# Patient Record
Sex: FEMALE | Race: BLACK OR AFRICAN AMERICAN | NOT HISPANIC OR LATINO | URBAN - METROPOLITAN AREA
[De-identification: names, ages, dates, MRNs, and addresses within clinical notes are randomized per-mention and may not be internally consistent; named-entity substitution may affect disease eponyms.]

---

## 2024-10-15 ENCOUNTER — APPOINTMENT (OUTPATIENT)
Dept: PRIMARY CARE | Facility: CLINIC | Age: 39
End: 2024-10-15
Payer: COMMERCIAL

## 2024-10-15 VITALS
HEIGHT: 66 IN | DIASTOLIC BLOOD PRESSURE: 96 MMHG | SYSTOLIC BLOOD PRESSURE: 145 MMHG | HEART RATE: 109 BPM | WEIGHT: 184 LBS | OXYGEN SATURATION: 96 % | BODY MASS INDEX: 29.57 KG/M2

## 2024-10-15 DIAGNOSIS — Z80.0 FAMILY HISTORY OF COLON CANCER IN MOTHER: ICD-10-CM

## 2024-10-15 DIAGNOSIS — Z82.3 FAMILY HISTORY OF STROKE: ICD-10-CM

## 2024-10-15 DIAGNOSIS — R29.898 RIGHT ARM WEAKNESS: Primary | ICD-10-CM

## 2024-10-15 DIAGNOSIS — Z00.00 HEALTH CARE MAINTENANCE: ICD-10-CM

## 2024-10-15 DIAGNOSIS — M79.601 RIGHT ARM PAIN: ICD-10-CM

## 2024-10-15 DIAGNOSIS — Z80.0 FAMILY HISTORY OF COLON CANCER IN FATHER: ICD-10-CM

## 2024-10-15 PROCEDURE — 99213 OFFICE O/P EST LOW 20 MIN: CPT | Performed by: STUDENT IN AN ORGANIZED HEALTH CARE EDUCATION/TRAINING PROGRAM

## 2024-10-15 PROCEDURE — 3008F BODY MASS INDEX DOCD: CPT | Performed by: STUDENT IN AN ORGANIZED HEALTH CARE EDUCATION/TRAINING PROGRAM

## 2024-10-15 NOTE — PROGRESS NOTES
"Subjective   Patient ID: Blank Bear is a 39 y.o. female who presents for Establish Care.  HPI  Blank Bear is 39 y.o. is here to establish care    Chronic active medical problems    # right arm  in tetanus vaccine in may    achy on and off  since then   6-7 /10   Some weakness  some pain currently     bp      Past surgeries right ovarian cyst removal in 2004  Allergies red dye in tylenol     T X  Alc X  Marijaun X    FH   DM: mom .   Stroke: mom   Mother- colon cancer:  55  Father - colon and cancer  50   Heart issues: none         Past Medical History:   Diagnosis Date    Acute vaginitis 05/01/2015    Bacterial vaginosis    Cutaneous abscess of buttock 07/20/2015    Abscess of buttock    Other fecal abnormalities 11/11/2016    Loose stools    Personal history of other specified conditions 12/08/2016    History of diarrhea    Unspecified abdominal pain 12/08/2016    Abdominal cramping    Unspecified ovarian cyst, left side 04/22/2015    Left ovarian cyst      Past Surgical History:   Procedure Laterality Date    OTHER SURGICAL HISTORY  04/22/2015    Ovarian Cystectomy Right      No family history on file.   Not on File       Occupation:     Review of Systems   Constitutional:  Negative for activity change and fever.   HENT:  Negative for congestion.    Respiratory:  Negative for cough, shortness of breath and wheezing.    Cardiovascular:  Negative for chest pain and leg swelling.   Gastrointestinal:  Negative for abdominal pain, constipation, nausea and vomiting.   Endocrine: Negative for cold intolerance.   Genitourinary:  Negative for dysuria, hematuria and urgency.   Neurological:  Negative for dizziness, speech difficulty, weakness and numbness.   Psychiatric/Behavioral:  Negative for self-injury and suicidal ideas.        Objective   Visit Vitals  BP (!) 145/96   Pulse 109   Ht 1.676 m (5' 6\")   Wt 83.5 kg (184 lb)   SpO2 96%   BMI 29.70 kg/m²   Smoking Status Never   BSA 1.97 m²      Physical " Exam  HENT:      Head: Normocephalic and atraumatic.      Right Ear: Tympanic membrane normal.      Left Ear: Tympanic membrane normal.      Nose: Nose normal.      Mouth/Throat:      Mouth: Mucous membranes are moist.   Eyes:      Extraocular Movements: Extraocular movements intact.      Conjunctiva/sclera: Conjunctivae normal.      Pupils: Pupils are equal, round, and reactive to light.   Cardiovascular:      Rate and Rhythm: Normal rate and regular rhythm.      Pulses: Normal pulses.      Heart sounds: Normal heart sounds.   Pulmonary:      Effort: Pulmonary effort is normal.      Breath sounds: Normal breath sounds. No stridor. No rhonchi.   Abdominal:      General: Bowel sounds are normal.      Palpations: Abdomen is soft.      Tenderness: There is no abdominal tenderness. There is no guarding or rebound.   Musculoskeletal:      Cervical back: Neck supple.   Neurological:      Mental Status: She is oriented to person, place, and time.      Cranial Nerves: No cranial nerve deficit.      Sensory: No sensory deficit.      Motor: No weakness.      Coordination: Coordination normal.      Gait: Gait normal.      Deep Tendon Reflexes: Reflexes normal.   Psychiatric:         Mood and Affect: Mood normal.         Behavior: Behavior normal.         Assessment/Plan   Diagnoses and all orders for this visit:  Right arm weakness  Unsure of the cause. Subjective weakness; no FND elucidated   Will get EMG and MRI   -     EMG & nerve conduction; Future  -     MR brain wo IV contrast; Future  Right arm pain  No miladys sign   -     US extremity nonvascular real time w image documentation limited anatomic specific; Future  Health care maintenance  -     Hemoglobin A1C; Future  -     CBC; Future  -     Lipid Panel; Future  -     Comprehensive Metabolic Panel; Future  -     TSH with reflex to Free T4 if abnormal; Future  -     Colonoscopy Screening; High Risk Patient; father and mother with colon cancer; Future  Family history of  stroke  -     EMG & nerve conduction; Future  -     MR brain wo IV contrast; Future  Family history of colon cancer in father  -     Colonoscopy Screening; High Risk Patient; father and mother with colon cancer; Future  Family history of colon cancer in mother  -     Colonoscopy Screening; High Risk Patient; father and mother with colon cancer; Future  Patient to seek medical attention immediately / call 911  if has worsening of symptoms  or develops alarm symptoms as discussed. Verbalizes understanding

## 2024-10-22 ENCOUNTER — APPOINTMENT (OUTPATIENT)
Dept: RADIOLOGY | Facility: HOSPITAL | Age: 39
End: 2024-10-22
Payer: COMMERCIAL

## 2024-10-24 ASSESSMENT — ENCOUNTER SYMPTOMS
WEAKNESS: 0
CONSTIPATION: 0
FEVER: 0
NAUSEA: 0
DYSURIA: 0
DIZZINESS: 0
COUGH: 0
VOMITING: 0
SPEECH DIFFICULTY: 0
WHEEZING: 0
HEMATURIA: 0
NUMBNESS: 0
ABDOMINAL PAIN: 0
SHORTNESS OF BREATH: 0
ACTIVITY CHANGE: 0

## 2024-10-31 ENCOUNTER — HOSPITAL ENCOUNTER (OUTPATIENT)
Dept: RADIOLOGY | Facility: HOSPITAL | Age: 39
End: 2024-10-31
Payer: COMMERCIAL

## 2024-11-16 ENCOUNTER — APPOINTMENT (OUTPATIENT)
Dept: RADIOLOGY | Facility: CLINIC | Age: 39
End: 2024-11-16
Payer: COMMERCIAL

## 2024-11-18 ENCOUNTER — APPOINTMENT (OUTPATIENT)
Dept: PRIMARY CARE | Facility: CLINIC | Age: 39
End: 2024-11-18
Payer: COMMERCIAL

## 2024-11-18 DIAGNOSIS — R29.898 RIGHT ARM WEAKNESS: ICD-10-CM

## 2024-11-18 DIAGNOSIS — M79.601 RIGHT ARM PAIN: Primary | ICD-10-CM

## 2024-11-23 ENCOUNTER — HOSPITAL ENCOUNTER (OUTPATIENT)
Dept: RADIOLOGY | Facility: HOSPITAL | Age: 39
Discharge: HOME | End: 2024-11-23
Payer: COMMERCIAL

## 2024-11-23 DIAGNOSIS — Z82.3 FAMILY HISTORY OF STROKE: ICD-10-CM

## 2024-11-23 DIAGNOSIS — R29.898 RIGHT ARM WEAKNESS: ICD-10-CM

## 2024-11-23 PROCEDURE — 70551 MRI BRAIN STEM W/O DYE: CPT | Performed by: RADIOLOGY

## 2024-11-23 PROCEDURE — 70551 MRI BRAIN STEM W/O DYE: CPT

## 2024-11-27 DIAGNOSIS — M79.601 RIGHT ARM PAIN: Primary | ICD-10-CM

## 2024-11-27 DIAGNOSIS — R29.898 RIGHT ARM WEAKNESS: ICD-10-CM

## 2024-11-29 ENCOUNTER — APPOINTMENT (OUTPATIENT)
Dept: RADIOLOGY | Facility: CLINIC | Age: 39
End: 2024-11-29
Payer: COMMERCIAL

## 2024-11-29 ENCOUNTER — APPOINTMENT (OUTPATIENT)
Dept: PRIMARY CARE | Facility: CLINIC | Age: 39
End: 2024-11-29
Payer: COMMERCIAL

## 2024-11-29 DIAGNOSIS — Z12.31 SCREENING MAMMOGRAM FOR BREAST CANCER: ICD-10-CM

## 2024-12-11 ENCOUNTER — APPOINTMENT (OUTPATIENT)
Dept: PRIMARY CARE | Facility: CLINIC | Age: 39
End: 2024-12-11
Payer: COMMERCIAL

## 2024-12-24 ENCOUNTER — APPOINTMENT (OUTPATIENT)
Dept: RADIOLOGY | Facility: CLINIC | Age: 39
End: 2024-12-24
Payer: COMMERCIAL

## 2025-04-13 ENCOUNTER — APPOINTMENT (OUTPATIENT)
Dept: RADIOLOGY | Facility: HOSPITAL | Age: 40
End: 2025-04-13
Payer: COMMERCIAL

## 2025-04-13 ENCOUNTER — APPOINTMENT (OUTPATIENT)
Dept: CARDIOLOGY | Facility: HOSPITAL | Age: 40
End: 2025-04-13
Payer: COMMERCIAL

## 2025-04-13 ENCOUNTER — HOSPITAL ENCOUNTER (EMERGENCY)
Facility: HOSPITAL | Age: 40
Discharge: HOME | End: 2025-04-13
Attending: STUDENT IN AN ORGANIZED HEALTH CARE EDUCATION/TRAINING PROGRAM
Payer: COMMERCIAL

## 2025-04-13 VITALS
HEIGHT: 66 IN | BODY MASS INDEX: 30.22 KG/M2 | OXYGEN SATURATION: 98 % | DIASTOLIC BLOOD PRESSURE: 81 MMHG | RESPIRATION RATE: 13 BRPM | WEIGHT: 188 LBS | TEMPERATURE: 97.9 F | SYSTOLIC BLOOD PRESSURE: 124 MMHG | HEART RATE: 74 BPM

## 2025-04-13 DIAGNOSIS — B97.89 VIRAL SINUSITIS: ICD-10-CM

## 2025-04-13 DIAGNOSIS — R07.9 CHEST PAIN, UNSPECIFIED TYPE: ICD-10-CM

## 2025-04-13 DIAGNOSIS — R51.9 ACUTE NONINTRACTABLE HEADACHE, UNSPECIFIED HEADACHE TYPE: Primary | ICD-10-CM

## 2025-04-13 DIAGNOSIS — J32.9 VIRAL SINUSITIS: ICD-10-CM

## 2025-04-13 LAB
AMORPH CRY #/AREA UR COMP ASSIST: ABNORMAL /HPF
ANION GAP SERPL CALC-SCNC: 12 MMOL/L (ref 10–20)
APPEARANCE UR: ABNORMAL
BACTERIA #/AREA URNS AUTO: ABNORMAL /HPF
BASOPHILS # BLD AUTO: 0.03 X10*3/UL (ref 0–0.1)
BASOPHILS NFR BLD AUTO: 0.4 %
BILIRUB UR STRIP.AUTO-MCNC: NEGATIVE MG/DL
BUN SERPL-MCNC: 13 MG/DL (ref 6–23)
CALCIUM SERPL-MCNC: 9.4 MG/DL (ref 8.6–10.3)
CARDIAC TROPONIN I PNL SERPL HS: <3 NG/L (ref 0–13)
CARDIAC TROPONIN I PNL SERPL HS: <3 NG/L (ref 0–13)
CHLORIDE SERPL-SCNC: 107 MMOL/L (ref 98–107)
CO2 SERPL-SCNC: 27 MMOL/L (ref 21–32)
COLOR UR: ABNORMAL
CREAT SERPL-MCNC: 0.92 MG/DL (ref 0.5–1.05)
EGFRCR SERPLBLD CKD-EPI 2021: 80 ML/MIN/1.73M*2
EOSINOPHIL # BLD AUTO: 0.13 X10*3/UL (ref 0–0.7)
EOSINOPHIL NFR BLD AUTO: 1.6 %
ERYTHROCYTE [DISTWIDTH] IN BLOOD BY AUTOMATED COUNT: 13.2 % (ref 11.5–14.5)
FLUAV RNA RESP QL NAA+PROBE: NOT DETECTED
FLUBV RNA RESP QL NAA+PROBE: NOT DETECTED
GLUCOSE SERPL-MCNC: 121 MG/DL (ref 74–99)
GLUCOSE UR STRIP.AUTO-MCNC: NORMAL MG/DL
HCG UR QL IA.RAPID: NEGATIVE
HCT VFR BLD AUTO: 32.9 % (ref 36–46)
HGB BLD-MCNC: 10 G/DL (ref 12–16)
IMM GRANULOCYTES # BLD AUTO: 0.02 X10*3/UL (ref 0–0.7)
IMM GRANULOCYTES NFR BLD AUTO: 0.2 % (ref 0–0.9)
KETONES UR STRIP.AUTO-MCNC: NEGATIVE MG/DL
LEUKOCYTE ESTERASE UR QL STRIP.AUTO: ABNORMAL
LYMPHOCYTES # BLD AUTO: 1.46 X10*3/UL (ref 1.2–4.8)
LYMPHOCYTES NFR BLD AUTO: 17.5 %
MAGNESIUM SERPL-MCNC: 1.99 MG/DL (ref 1.6–2.4)
MCH RBC QN AUTO: 28 PG (ref 26–34)
MCHC RBC AUTO-ENTMCNC: 30.4 G/DL (ref 32–36)
MCV RBC AUTO: 92 FL (ref 80–100)
MONOCYTES # BLD AUTO: 0.7 X10*3/UL (ref 0.1–1)
MONOCYTES NFR BLD AUTO: 8.4 %
MUCOUS THREADS #/AREA URNS AUTO: ABNORMAL /LPF
NEUTROPHILS # BLD AUTO: 6 X10*3/UL (ref 1.2–7.7)
NEUTROPHILS NFR BLD AUTO: 71.9 %
NITRITE UR QL STRIP.AUTO: NEGATIVE
NRBC BLD-RTO: 0 /100 WBCS (ref 0–0)
PH UR STRIP.AUTO: 7.5 [PH]
PLATELET # BLD AUTO: 279 X10*3/UL (ref 150–450)
POTASSIUM SERPL-SCNC: 3.9 MMOL/L (ref 3.5–5.3)
PROT UR STRIP.AUTO-MCNC: NEGATIVE MG/DL
RBC # BLD AUTO: 3.57 X10*6/UL (ref 4–5.2)
RBC # UR STRIP.AUTO: NEGATIVE MG/DL
RBC #/AREA URNS AUTO: ABNORMAL /HPF
SARS-COV-2 RNA RESP QL NAA+PROBE: NOT DETECTED
SODIUM SERPL-SCNC: 142 MMOL/L (ref 136–145)
SP GR UR STRIP.AUTO: 1.02
SQUAMOUS #/AREA URNS AUTO: ABNORMAL /HPF
UROBILINOGEN UR STRIP.AUTO-MCNC: NORMAL MG/DL
WBC # BLD AUTO: 8.3 X10*3/UL (ref 4.4–11.3)
WBC #/AREA URNS AUTO: ABNORMAL /HPF

## 2025-04-13 PROCEDURE — 81025 URINE PREGNANCY TEST: CPT | Performed by: STUDENT IN AN ORGANIZED HEALTH CARE EDUCATION/TRAINING PROGRAM

## 2025-04-13 PROCEDURE — 99285 EMERGENCY DEPT VISIT HI MDM: CPT | Mod: 25 | Performed by: STUDENT IN AN ORGANIZED HEALTH CARE EDUCATION/TRAINING PROGRAM

## 2025-04-13 PROCEDURE — 71046 X-RAY EXAM CHEST 2 VIEWS: CPT | Performed by: RADIOLOGY

## 2025-04-13 PROCEDURE — 85025 COMPLETE CBC W/AUTO DIFF WBC: CPT | Performed by: STUDENT IN AN ORGANIZED HEALTH CARE EDUCATION/TRAINING PROGRAM

## 2025-04-13 PROCEDURE — 80048 BASIC METABOLIC PNL TOTAL CA: CPT | Performed by: STUDENT IN AN ORGANIZED HEALTH CARE EDUCATION/TRAINING PROGRAM

## 2025-04-13 PROCEDURE — 87636 SARSCOV2 & INF A&B AMP PRB: CPT | Performed by: STUDENT IN AN ORGANIZED HEALTH CARE EDUCATION/TRAINING PROGRAM

## 2025-04-13 PROCEDURE — 93005 ELECTROCARDIOGRAM TRACING: CPT

## 2025-04-13 PROCEDURE — 71046 X-RAY EXAM CHEST 2 VIEWS: CPT

## 2025-04-13 PROCEDURE — 2500000004 HC RX 250 GENERAL PHARMACY W/ HCPCS (ALT 636 FOR OP/ED): Performed by: STUDENT IN AN ORGANIZED HEALTH CARE EDUCATION/TRAINING PROGRAM

## 2025-04-13 PROCEDURE — 36415 COLL VENOUS BLD VENIPUNCTURE: CPT | Performed by: STUDENT IN AN ORGANIZED HEALTH CARE EDUCATION/TRAINING PROGRAM

## 2025-04-13 PROCEDURE — 83735 ASSAY OF MAGNESIUM: CPT | Performed by: STUDENT IN AN ORGANIZED HEALTH CARE EDUCATION/TRAINING PROGRAM

## 2025-04-13 PROCEDURE — 87086 URINE CULTURE/COLONY COUNT: CPT | Mod: AHULAB | Performed by: STUDENT IN AN ORGANIZED HEALTH CARE EDUCATION/TRAINING PROGRAM

## 2025-04-13 PROCEDURE — 84484 ASSAY OF TROPONIN QUANT: CPT | Performed by: STUDENT IN AN ORGANIZED HEALTH CARE EDUCATION/TRAINING PROGRAM

## 2025-04-13 PROCEDURE — 96366 THER/PROPH/DIAG IV INF ADDON: CPT

## 2025-04-13 PROCEDURE — 96365 THER/PROPH/DIAG IV INF INIT: CPT

## 2025-04-13 PROCEDURE — 96375 TX/PRO/DX INJ NEW DRUG ADDON: CPT

## 2025-04-13 PROCEDURE — 81001 URINALYSIS AUTO W/SCOPE: CPT | Performed by: STUDENT IN AN ORGANIZED HEALTH CARE EDUCATION/TRAINING PROGRAM

## 2025-04-13 RX ORDER — PROCHLORPERAZINE EDISYLATE 5 MG/ML
10 INJECTION INTRAMUSCULAR; INTRAVENOUS ONCE
Status: COMPLETED | OUTPATIENT
Start: 2025-04-13 | End: 2025-04-13

## 2025-04-13 RX ORDER — IBUPROFEN 600 MG/1
600 TABLET ORAL EVERY 6 HOURS PRN
Qty: 30 TABLET | Refills: 0 | Status: SHIPPED | OUTPATIENT
Start: 2025-04-13 | End: 2025-04-20

## 2025-04-13 RX ORDER — MAGNESIUM SULFATE HEPTAHYDRATE 40 MG/ML
2 INJECTION, SOLUTION INTRAVENOUS ONCE
Status: COMPLETED | OUTPATIENT
Start: 2025-04-13 | End: 2025-04-13

## 2025-04-13 RX ORDER — KETOROLAC TROMETHAMINE 30 MG/ML
15 INJECTION, SOLUTION INTRAMUSCULAR; INTRAVENOUS ONCE
Status: COMPLETED | OUTPATIENT
Start: 2025-04-13 | End: 2025-04-13

## 2025-04-13 RX ADMIN — MAGNESIUM SULFATE HEPTAHYDRATE 2 G: 40 INJECTION, SOLUTION INTRAVENOUS at 18:19

## 2025-04-13 RX ADMIN — PROCHLORPERAZINE EDISYLATE 10 MG: 5 INJECTION INTRAMUSCULAR; INTRAVENOUS at 18:10

## 2025-04-13 RX ADMIN — SODIUM CHLORIDE, SODIUM LACTATE, POTASSIUM CHLORIDE, AND CALCIUM CHLORIDE 1000 ML: .6; .31; .03; .02 INJECTION, SOLUTION INTRAVENOUS at 18:11

## 2025-04-13 RX ADMIN — KETOROLAC TROMETHAMINE 15 MG: 30 INJECTION, SOLUTION INTRAMUSCULAR at 18:10

## 2025-04-13 ASSESSMENT — PAIN DESCRIPTION - LOCATION
LOCATION: CHEST
LOCATION: HEAD

## 2025-04-13 ASSESSMENT — PAIN SCALES - GENERAL
PAINLEVEL_OUTOF10: 0 - NO PAIN
PAINLEVEL_OUTOF10: 7
PAINLEVEL_OUTOF10: 4

## 2025-04-13 ASSESSMENT — COLUMBIA-SUICIDE SEVERITY RATING SCALE - C-SSRS
6. HAVE YOU EVER DONE ANYTHING, STARTED TO DO ANYTHING, OR PREPARED TO DO ANYTHING TO END YOUR LIFE?: NO
1. IN THE PAST MONTH, HAVE YOU WISHED YOU WERE DEAD OR WISHED YOU COULD GO TO SLEEP AND NOT WAKE UP?: NO
2. HAVE YOU ACTUALLY HAD ANY THOUGHTS OF KILLING YOURSELF?: NO

## 2025-04-13 ASSESSMENT — PAIN - FUNCTIONAL ASSESSMENT: PAIN_FUNCTIONAL_ASSESSMENT: 0-10

## 2025-04-13 NOTE — ED TRIAGE NOTES
Pt to ED with complaint of SOB and chest pain with exertion onset of three days. Arrives ambulatory and a/o x4. Denies any cardiac hx. Denies N/V/D. Denies any other complaints.

## 2025-04-13 NOTE — ED PROVIDER NOTES
Emergency Department Provider Note        History of Present Illness     Chief Complaint: Headache, Chest Pain  History provided by: Patient  Limitations to History: None  External Chart Review: PCP visit 10/15/24 seen for health maintenance, noted subjective weakness R arm ultimately obtained MRI brain which was negative for acute pathology    HPI:  Blank Bear is a 39 y.o. female presenting to the ED for headache.     Patient reports over the past few days she has had sinus pain, sinus pressure, nasal congestion, sore throat.  Reports that she had a feeling of fullness in her throat and has been trying to take over-the-counter sinus medications without much relief of symptoms.  She denies dysphagia, dysphonia, drooling, stridor fevers, chills, cough, shortness of breath.  Today she developed a headache that is mostly frontal in nature.  She denies visual changes, numbness, weakness, neck pain or stiffness.  Denies falls or trauma.  Also reports that while walking at the grocery store she had some chest pain that has since resolved.  Denies associated diaphoresis or nausea. Patient denied recent surgery or immobilization, recent long travel, hormonal birth control or hormone replacement therapy, prior history of DVT/PE.  She reports that she has a family history of early CVA (but no hx family CAD) so was concerned about the chest pain and came to the ED.    Physical Exam   Triage vitals:  T 36.6 °C (97.9 °F)  HR (!) 116  /83  RR 18  O2 99 % None (Room air)    Constitutional: Awake, alert, not in acute distress  HENT: Head atraumatic, mucous membranes moist, oropharynx mildly erythematous, no exudates or swelling, uvula midline, no stridor  Eyes:  Conjunctivae normal  Neck:  Supple  Lungs: Clear to auscultation, breath sounds equal and symmetric, no wheezes, rales, or rhonchi  Heart: Tachycardic during triage however on my exam regular rate and rhythm, no murmur, rub or gallop, 2+ radial and DP pulses  bilaterally  Abdomen: Soft, nontender, nondistended, no rebound or guarding  Extremities: No lower extremity edema  Neuro: Alert and oriented to person, place, and time, PERRL, CN 2-12 intact, strength 5/5 BUE and BLE, SILT BUE and BLE,  normal FNF and ANGELA, normal gait  Skin: Warm, dry  Psych: Calm, cooperative    Medical Decision Making & ED Course   Medical Decision Making:  Blank Bear is a 39 y.o. female who presented to the ED for headache and chest pain. Patient was afebrile, hemodynamically stable, and satting on room air on arrival.     Patient is well appearing, in no acute distress, benign, nonfocal neurologic exam with CN II-XII, strength, sensation, coordination, and gait in tact.    Clinical course as below in ED course:  ED Course as of 04/13/25 2334   Sun Apr 13, 2025   1800 ECG 12 lead  I have personally reviewed and interpreted this EKG.  Sinus tachycardia, rate 116 BPM  Normal axis  KS interval and QRS duration within normal limits.  QTc within normal limits.  No signs of acute ischemia or infarction   [SS]   1801 XR chest 2 views  I have personally reviewed and interpreted this CXR, which shows no PTX, PNA, pulmonary edema, or widened mediastinum. Final decision making pending radiology read.     [SS]   1811 XR chest 2 views  Radiology read:  IMPRESSION:  No focal infiltrate or pneumothorax.   [SS]   1840 CBC and Auto Differential(!)  Mild anemia, no leukocytosis or thrombocytopenia [SS]   1910 Sars-CoV-2 and Influenza A/B PCR  neg [SS]   1910 Troponin I Series, High Sensitivity (0, 1 HR)  neg [SS]   1920 Basic metabolic panel(!)  No clinically significant electrolyte abnormalities, no THANIA   [SS]   1920 Magnesium  normal [SS]   2036 hCG, Urine, Qualitative  neg [SS]      ED Course User Index  [SS] Steffen Simerlink, MD         Diagnoses as of 04/13/25 2334   Acute nonintractable headache, unspecified headache type   Viral sinusitis   Chest pain, unspecified type       A/P:    Initial EKG shows  a sinus tachycardia without signs of acute ischemia.  This was obtained during her in triage intake and on my exam she is no longer tachycardic without any interventions.  Chest x-ray without evidence of pneumonia, pneumothorax, pulmonary edema, pleural effusion.  Labs otherwise unremarkable as above.  He has no red flag features of headache to necessitate CT today.  She was treated symptomatically with Toradol, Compazine, magnesium, and a bolus of LR.    On re-examination, they are well-appearing and have remained hemodynamically stable. They are ambulating without difficulty and tolerating PO.  Her symptoms have completely resolved.  Suspect likely viral syndrome/viral sinusitis as etiology of presentation.  They are appropriate for discharge at this time. They will follow up with PCP. New prescriptions for discharge below. Return precautions were reviewed. Plan was discussed with patient and they are agreeable.  ------------------------------------------------  Independent Test Interpretation: See ED Course  CT head considered but not performed due to no red flag features, normal neuro exam    Disposition   As a result of the work-up, the patient was discharged home.  she was informed of her diagnosis and instructed to come back with any concerns or worsening of condition.  she and was agreeable to the plan as discussed above.  she was given the opportunity to ask questions.  All of the patient's questions were answered.    ED Prescriptions       Medication Sig Dispense Start Date End Date Auth. Provider    ibuprofen 600 mg tablet Take 1 tablet (600 mg) by mouth every 6 hours if needed for mild pain (1 - 3) for up to 7 days. 30 tablet 4/13/2025 4/20/2025 Steffen Simerlink, MD            Procedures   Procedures    Steffen Simerlink, MD Steffen Simerlink, MD  04/13/25 9181

## 2025-04-14 LAB
ATRIAL RATE: 116 BPM
HOLD SPECIMEN: NORMAL
P AXIS: 78 DEGREES
P OFFSET: 203 MS
P ONSET: 143 MS
PR INTERVAL: 156 MS
Q ONSET: 221 MS
QRS COUNT: 19 BEATS
QRS DURATION: 74 MS
QT INTERVAL: 322 MS
QTC CALCULATION(BAZETT): 447 MS
QTC FREDERICIA: 401 MS
R AXIS: 26 DEGREES
T AXIS: 63 DEGREES
T OFFSET: 382 MS
VENTRICULAR RATE: 116 BPM

## 2025-04-14 NOTE — DISCHARGE INSTRUCTIONS
Do not share your medication with anyone.    Headache Instructions: Return to the ED if your headache worsens, you develop weakness or numbness on one side of the body or the other, you develop blurred vision or difficulty with speech.  Chest Pain Instructions: Return to the ED if you develop worsening shortness of breath, chest pain, coughing up blood, or pain that radiates from your chest to your back.

## 2025-04-15 LAB — BACTERIA UR CULT: NORMAL

## 2025-04-22 ENCOUNTER — APPOINTMENT (OUTPATIENT)
Dept: OBSTETRICS AND GYNECOLOGY | Facility: CLINIC | Age: 40
End: 2025-04-22
Payer: COMMERCIAL

## 2025-05-06 ENCOUNTER — HOSPITAL ENCOUNTER (EMERGENCY)
Facility: HOSPITAL | Age: 40
Discharge: HOME | End: 2025-05-07
Attending: EMERGENCY MEDICINE
Payer: COMMERCIAL

## 2025-05-06 ENCOUNTER — APPOINTMENT (OUTPATIENT)
Dept: RADIOLOGY | Facility: HOSPITAL | Age: 40
End: 2025-05-06
Payer: COMMERCIAL

## 2025-05-06 ENCOUNTER — OFFICE VISIT (OUTPATIENT)
Dept: URGENT CARE | Age: 40
End: 2025-05-06
Payer: COMMERCIAL

## 2025-05-06 VITALS
BODY MASS INDEX: 30.34 KG/M2 | SYSTOLIC BLOOD PRESSURE: 128 MMHG | OXYGEN SATURATION: 99 % | DIASTOLIC BLOOD PRESSURE: 84 MMHG | WEIGHT: 188 LBS | HEART RATE: 95 BPM | TEMPERATURE: 98.5 F

## 2025-05-06 DIAGNOSIS — M54.9 BACK PAIN, UNSPECIFIED BACK LOCATION, UNSPECIFIED BACK PAIN LATERALITY, UNSPECIFIED CHRONICITY: Primary | ICD-10-CM

## 2025-05-06 DIAGNOSIS — R10.9 ACUTE RIGHT FLANK PAIN: Primary | ICD-10-CM

## 2025-05-06 DIAGNOSIS — N13.30 HYDRONEPHROSIS, UNSPECIFIED HYDRONEPHROSIS TYPE: ICD-10-CM

## 2025-05-06 LAB
ALBUMIN SERPL BCP-MCNC: 4.4 G/DL (ref 3.4–5)
ALP SERPL-CCNC: 39 U/L (ref 33–110)
ALT SERPL W P-5'-P-CCNC: 9 U/L (ref 7–45)
ANION GAP SERPL CALC-SCNC: 10 MMOL/L (ref 10–20)
APPEARANCE UR: ABNORMAL
AST SERPL W P-5'-P-CCNC: 13 U/L (ref 9–39)
BACTERIA #/AREA URNS AUTO: ABNORMAL /HPF
BASOPHILS # BLD AUTO: 0.02 X10*3/UL (ref 0–0.1)
BASOPHILS NFR BLD AUTO: 0.2 %
BILIRUB SERPL-MCNC: 0.6 MG/DL (ref 0–1.2)
BILIRUB UR STRIP.AUTO-MCNC: NEGATIVE MG/DL
BUN SERPL-MCNC: 13 MG/DL (ref 6–23)
CALCIUM SERPL-MCNC: 9.3 MG/DL (ref 8.6–10.3)
CHLORIDE SERPL-SCNC: 106 MMOL/L (ref 98–107)
CO2 SERPL-SCNC: 28 MMOL/L (ref 21–32)
COLOR UR: ABNORMAL
CREAT SERPL-MCNC: 1.07 MG/DL (ref 0.5–1.05)
EGFRCR SERPLBLD CKD-EPI 2021: 67 ML/MIN/1.73M*2
EOSINOPHIL # BLD AUTO: 0.02 X10*3/UL (ref 0–0.7)
EOSINOPHIL NFR BLD AUTO: 0.2 %
ERYTHROCYTE [DISTWIDTH] IN BLOOD BY AUTOMATED COUNT: 12.5 % (ref 11.5–14.5)
GLUCOSE SERPL-MCNC: 115 MG/DL (ref 74–99)
GLUCOSE UR STRIP.AUTO-MCNC: NORMAL MG/DL
HCG UR QL IA.RAPID: NEGATIVE
HCT VFR BLD AUTO: 32.7 % (ref 36–46)
HGB BLD-MCNC: 10.4 G/DL (ref 12–16)
IMM GRANULOCYTES # BLD AUTO: 0.05 X10*3/UL (ref 0–0.7)
IMM GRANULOCYTES NFR BLD AUTO: 0.4 % (ref 0–0.9)
KETONES UR STRIP.AUTO-MCNC: NEGATIVE MG/DL
LACTATE SERPL-SCNC: 1.2 MMOL/L (ref 0.4–2)
LEUKOCYTE ESTERASE UR QL STRIP.AUTO: ABNORMAL
LYMPHOCYTES # BLD AUTO: 0.84 X10*3/UL (ref 1.2–4.8)
LYMPHOCYTES NFR BLD AUTO: 6.4 %
MCH RBC QN AUTO: 28.7 PG (ref 26–34)
MCHC RBC AUTO-ENTMCNC: 31.8 G/DL (ref 32–36)
MCV RBC AUTO: 90 FL (ref 80–100)
MONOCYTES # BLD AUTO: 0.73 X10*3/UL (ref 0.1–1)
MONOCYTES NFR BLD AUTO: 5.5 %
MUCOUS THREADS #/AREA URNS AUTO: ABNORMAL /LPF
NEUTROPHILS # BLD AUTO: 11.5 X10*3/UL (ref 1.2–7.7)
NEUTROPHILS NFR BLD AUTO: 87.3 %
NITRITE UR QL STRIP.AUTO: NEGATIVE
NRBC BLD-RTO: 0 /100 WBCS (ref 0–0)
PH UR STRIP.AUTO: 8.5 [PH]
PLATELET # BLD AUTO: 301 X10*3/UL (ref 150–450)
POC APPEARANCE, URINE: CLEAR
POC BILIRUBIN, URINE: NEGATIVE
POC BLOOD, URINE: ABNORMAL
POC COLOR, URINE: YELLOW
POC GLUCOSE, URINE: NEGATIVE MG/DL
POC KETONES, URINE: NEGATIVE MG/DL
POC LEUKOCYTES, URINE: NEGATIVE
POC NITRITE,URINE: NEGATIVE
POC PH, URINE: 8 PH
POC PROTEIN, URINE: ABNORMAL MG/DL
POC SPECIFIC GRAVITY, URINE: 1.01
POC UROBILINOGEN, URINE: 0.2 EU/DL
POTASSIUM SERPL-SCNC: 4.3 MMOL/L (ref 3.5–5.3)
PREGNANCY TEST URINE, POC: NEGATIVE
PROT SERPL-MCNC: 7.4 G/DL (ref 6.4–8.2)
PROT UR STRIP.AUTO-MCNC: ABNORMAL MG/DL
RBC # BLD AUTO: 3.63 X10*6/UL (ref 4–5.2)
RBC # UR STRIP.AUTO: ABNORMAL MG/DL
RBC #/AREA URNS AUTO: >20 /HPF
SODIUM SERPL-SCNC: 140 MMOL/L (ref 136–145)
SP GR UR STRIP.AUTO: 1.03
SQUAMOUS #/AREA URNS AUTO: ABNORMAL /HPF
UROBILINOGEN UR STRIP.AUTO-MCNC: ABNORMAL MG/DL
WBC # BLD AUTO: 13.2 X10*3/UL (ref 4.4–11.3)
WBC #/AREA URNS AUTO: ABNORMAL /HPF

## 2025-05-06 PROCEDURE — 99203 OFFICE O/P NEW LOW 30 MIN: CPT

## 2025-05-06 PROCEDURE — 1036F TOBACCO NON-USER: CPT

## 2025-05-06 PROCEDURE — 2550000001 HC RX 255 CONTRASTS

## 2025-05-06 PROCEDURE — 81025 URINE PREGNANCY TEST: CPT

## 2025-05-06 PROCEDURE — 96375 TX/PRO/DX INJ NEW DRUG ADDON: CPT

## 2025-05-06 PROCEDURE — 99285 EMERGENCY DEPT VISIT HI MDM: CPT | Mod: 25 | Performed by: EMERGENCY MEDICINE

## 2025-05-06 PROCEDURE — 96361 HYDRATE IV INFUSION ADD-ON: CPT

## 2025-05-06 PROCEDURE — 85025 COMPLETE CBC W/AUTO DIFF WBC: CPT

## 2025-05-06 PROCEDURE — 87086 URINE CULTURE/COLONY COUNT: CPT | Mod: AHULAB

## 2025-05-06 PROCEDURE — 96374 THER/PROPH/DIAG INJ IV PUSH: CPT | Mod: 59

## 2025-05-06 PROCEDURE — 83605 ASSAY OF LACTIC ACID: CPT

## 2025-05-06 PROCEDURE — 2500000004 HC RX 250 GENERAL PHARMACY W/ HCPCS (ALT 636 FOR OP/ED): Performed by: EMERGENCY MEDICINE

## 2025-05-06 PROCEDURE — 74177 CT ABD & PELVIS W/CONTRAST: CPT | Performed by: RADIOLOGY

## 2025-05-06 PROCEDURE — 80053 COMPREHEN METABOLIC PANEL: CPT

## 2025-05-06 PROCEDURE — 81003 URINALYSIS AUTO W/O SCOPE: CPT

## 2025-05-06 PROCEDURE — 36415 COLL VENOUS BLD VENIPUNCTURE: CPT

## 2025-05-06 PROCEDURE — 81001 URINALYSIS AUTO W/SCOPE: CPT

## 2025-05-06 PROCEDURE — 74177 CT ABD & PELVIS W/CONTRAST: CPT

## 2025-05-06 RX ORDER — KETOROLAC TROMETHAMINE 30 MG/ML
15 INJECTION, SOLUTION INTRAMUSCULAR; INTRAVENOUS ONCE
Status: COMPLETED | OUTPATIENT
Start: 2025-05-06 | End: 2025-05-06

## 2025-05-06 RX ORDER — ONDANSETRON HYDROCHLORIDE 2 MG/ML
4 INJECTION, SOLUTION INTRAVENOUS ONCE
Status: COMPLETED | OUTPATIENT
Start: 2025-05-06 | End: 2025-05-06

## 2025-05-06 RX ADMIN — SODIUM CHLORIDE, SODIUM LACTATE, POTASSIUM CHLORIDE, AND CALCIUM CHLORIDE 1000 ML: .6; .31; .03; .02 INJECTION, SOLUTION INTRAVENOUS at 23:03

## 2025-05-06 RX ADMIN — ONDANSETRON 4 MG: 2 INJECTION, SOLUTION INTRAMUSCULAR; INTRAVENOUS at 23:03

## 2025-05-06 RX ADMIN — KETOROLAC TROMETHAMINE 15 MG: 30 INJECTION, SOLUTION INTRAMUSCULAR at 22:55

## 2025-05-06 RX ADMIN — IOHEXOL 75 ML: 350 INJECTION, SOLUTION INTRAVENOUS at 20:58

## 2025-05-06 ASSESSMENT — PAIN SCALES - GENERAL
PAINLEVEL_OUTOF10: 0 - NO PAIN
PAINLEVEL_OUTOF10: 6
PAINLEVEL_OUTOF10: 8

## 2025-05-06 ASSESSMENT — PAIN - FUNCTIONAL ASSESSMENT
PAIN_FUNCTIONAL_ASSESSMENT: 0-10
PAIN_FUNCTIONAL_ASSESSMENT: 0-10

## 2025-05-06 ASSESSMENT — PAIN DESCRIPTION - ORIENTATION
ORIENTATION: RIGHT;LEFT
ORIENTATION: RIGHT

## 2025-05-06 ASSESSMENT — PAIN DESCRIPTION - LOCATION
LOCATION: ABDOMEN
LOCATION: BACK

## 2025-05-06 ASSESSMENT — ENCOUNTER SYMPTOMS
DYSURIA: 0
FREQUENCY: 1
BACK PAIN: 1
CONSTITUTIONAL NEGATIVE: 1

## 2025-05-06 NOTE — Clinical Note
Blank Bear was seen and treated in our emergency department on 5/6/2025.  She may return to work on 05/08/2025.       If you have any questions or concerns, please don't hesitate to call.      Junior Lewis MD

## 2025-05-06 NOTE — PROGRESS NOTES
Subjective   Patient ID: Blank Bear is a 39 y.o. female. They present today with a chief complaint of Back Pain (C/o mid low back pain  radiating to right x 3 hours.  Relief when empty bladder.  ).    History of Present Illness  39-year-old female presents to clinic today with complaints of right sided back pain.  Patient states that the mid low back that radiates to the right side.  Patient states been ongoing for 3 hours and progressively worsening.  Patient states the pain is 10 out of 10.  She did begin puking and office.  She denies fevers.  She states some symptoms are relieved with urination but they return.  She states she is currently on her menstrual cycle.  Denies abdominal pain.      Back Pain  Pertinent negatives include no dysuria.       Past Medical History  Allergies as of 05/06/2025 - Reviewed 04/13/2025   Allergen Reaction Noted    Acetaminophen Hives 04/13/2025    Red dye Hives 05/06/2025       Prescriptions Prior to Admission[1]     Medical History[2]    Surgical History[3]     reports that she has never smoked. She has never used smokeless tobacco. She reports that she does not currently use alcohol. She reports that she does not use drugs.    Review of Systems  Review of Systems   Constitutional: Negative.    Genitourinary:  Positive for frequency and urgency. Negative for dysuria.   Musculoskeletal:  Positive for back pain.                                  Objective    Vitals:    05/06/25 1907   BP: 128/84   Pulse: 95   Temp: 36.9 °C (98.5 °F)   TempSrc: Oral   SpO2: 99%   Weight: 85.3 kg (188 lb)     No LMP recorded.    Physical Exam  Constitutional:       Appearance: Normal appearance.   Abdominal:      Tenderness: There is right CVA tenderness.   Neurological:      Mental Status: She is alert.         Procedures    Point of Care Test & Imaging Results from this visit  No results found for this visit on 05/06/25.   Imaging  No results found.    Cardiology, Vascular, and Other Imaging  No  other imaging results found for the past 2 days      Diagnostic study results (if any) were reviewed by Suhas Barcenas PA-C.    Assessment/Plan   Allergies, medications, history, and pertinent labs/EKGs/Imaging reviewed by Suhas Barcenas PA-C.     Medical Decision Making  Patient pleasant on examination.    She is in acute pain.  She did vomit in office.  There is noted tenderness to the right CVA.    Urinalysis was completed did reveal blood.  This may be from the menstrual cycle however I am also concerned for possible kidney stone.    I discussed with patient that I cannot give a full diagnosis without CT scan however if we treated as such I can give oral Zofran and Toradol.  I did advise with this severe pain she is in a confirmed diagnosis I recommend the emergency room.  She is in agreement with plan and going to [facility] for further evaluation.    Orders and Diagnoses  Diagnoses and all orders for this visit:  Back pain, unspecified back location, unspecified back pain laterality, unspecified chronicity  -     POCT UA Automated manually resulted  -     POCT pregnancy, urine manually resulted      Medical Admin Record      Patient disposition: ED    Electronically signed by Suhas Barcenas PA-C  7:18 PM           [1] (Not in a hospital admission)  [2]   Past Medical History:  Diagnosis Date    Acute vaginitis 05/01/2015    Bacterial vaginosis    Cutaneous abscess of buttock 07/20/2015    Abscess of buttock    Other fecal abnormalities 11/11/2016    Loose stools    Personal history of other specified conditions 12/08/2016    History of diarrhea    Unspecified abdominal pain 12/08/2016    Abdominal cramping    Unspecified ovarian cyst, left side 04/22/2015    Left ovarian cyst   [3]   Past Surgical History:  Procedure Laterality Date    OTHER SURGICAL HISTORY  04/22/2015    Ovarian Cystectomy Right

## 2025-05-06 NOTE — ED TRIAGE NOTES
Pt to ED with complaint of right flank pain radiating into the back. Concern for kidney stone. Pt referred from urgent care. Denies any diarrhea, endorses vomiting. States sx have been going on for the last four hours. Denies CP/SOB. Denies any urinary sx.

## 2025-05-07 VITALS
TEMPERATURE: 98.2 F | RESPIRATION RATE: 16 BRPM | HEART RATE: 92 BPM | SYSTOLIC BLOOD PRESSURE: 124 MMHG | OXYGEN SATURATION: 98 % | DIASTOLIC BLOOD PRESSURE: 80 MMHG

## 2025-05-07 RX ORDER — TAMSULOSIN HYDROCHLORIDE 0.4 MG/1
0.4 CAPSULE ORAL DAILY
Qty: 14 CAPSULE | Refills: 0 | Status: SHIPPED | OUTPATIENT
Start: 2025-05-07 | End: 2025-05-21

## 2025-05-07 RX ORDER — NAPROXEN 500 MG/1
500 TABLET ORAL
Qty: 14 TABLET | Refills: 0 | Status: SHIPPED | OUTPATIENT
Start: 2025-05-07 | End: 2025-05-14

## 2025-05-07 RX ORDER — ONDANSETRON 4 MG/1
4 TABLET, ORALLY DISINTEGRATING ORAL EVERY 8 HOURS PRN
Qty: 9 TABLET | Refills: 0 | Status: SHIPPED | OUTPATIENT
Start: 2025-05-07 | End: 2025-05-10

## 2025-05-07 NOTE — DISCHARGE INSTRUCTIONS
Please take naproxen for pain and Zofran if needed for nausea.  Take Flomax as this will increase passage of suspected stone in the ureter.  Referral has been placed for follow-up with urologist.  Please return to emergency department for reassessment if new or worsening symptoms.

## 2025-05-07 NOTE — ED TRIAGE NOTES
As provider-in-triage, I performed a medical screening history and physical exam on this patient.  HISTORY OF PRESENT ILLNESS  Patient is a 39-year-old female presenting with concern for right-sided flank pain.  Pain starts in the right back radiates around to the right abdomen.  She feels nauseous and has vomited 2 times.  She has had subjective chills but no fevers.  She has no prior history of kidney stones.  Denies urinary symptoms.  She is currently on her menstrual cycle.  Patient was seen urgent care who referred her to ED.     PHYSICAL EXAM  Vital Signs reviewed.  Cardiovascular: Regular rate and rhythm. No m/g/r  Respiratory: No respiratory distress. No accessory muscle use. Breath sounds are present and equal b/l. No adventitious sounds.   Abdomen: There is right-sided CVA tenderness       MDM  Workup initiated. Pt stable pending bed availability and further evaluation. Please see subsequent provider note for further details and disposition.         I evaluated this patient in triage with the RN. Due to the patients complaint labs and or imaging were ordered by myself in an attempt to expedite patient care however I am not participating in care after evaluation. This is a preliminary assessment. Pt does not appear in acute distress at this time. They will have a full evaluation as soon as possible. They will be cared for by another provider who will possibly order more labs, imaging or interventions. Pt did not have a full ROS or PE completed by myself however above is a summary with reasons for orders.  For the remainder of the patient's workup and ED course, please refer to the main ED provider note. We discussed need for diagnostic testing including laboratory studies and imaging.  We also discussed that patient may be asked to wait in the waiting room while these tests are pending.  They understand that if they choose to leave without having the testing completed or resulted that we cannot rule out  acute life threatening illnesses and the risks involved could lead to worsening condition, permanent disability or even death.

## 2025-05-07 NOTE — ED PROVIDER NOTES
Chief Complaint   Patient presents with    Flank Pain     Right     History of Present Illness   Blank Bear   MRN [id number]    39-year-old presents for evaluation of right-sided mid back pain radiating to the right abdomen associated with nausea and vomiting started earlier this afternoon.  No associated chest pain or shortness of breath.  Initially seen at urgent care and referred to the emergency department for further workup.    Physical Exam   T 36.8 °C (98.2 °F)  HR 90  /76  RR 18  O2 98 % None (Room air)    General: Sitting on stretcher appears uncomfortable.  Head: Atraumatic.  Eyes: No conjunctival injection.   Ears Nose Throat: No epistaxis. Oral mucosa moist.  Neck: Supple.  Cardiovascular: Extremities warm.   Pulmonary: No stridor. Normal respiratory effort.  Abdominal: No distention.  Soft.  Right sided tenderness without rebound or guarding.  Right CVA tenderness.  Musculoskeletal: No deformity or joint swelling.  No midline cervical thoracic or lumbar tenderness.  Skin: No rash.  Psychiatric: Does not appear to respond to internal stimuli.  Neurologic: Alert. Follows directions. Face symmetric. No aphasia or dysarthria. Symmetric movement of upper and lower extremities.    ED Course & Medical Decision Making   Triage vital signs within normal limits.  Patient appeared uncomfortable but nontoxic.  Acute onset right-sided back pain radiating to the right lower abdomen associated with nausea and vomiting somewhat relieved by urination most suspicious for ureteral stone.  Beta-hCG was negative.  Labs demonstrated mild leukocytosis, slight increase in creatinine compared to previous, normal LFTs and lactate.  Urinalysis more suggestive of ureteral stone rather than concurrent urinary tract infection.  CT of the abdomen pelvis demonstrated right hydronephrosis and perinephric stranding as well as possible calculus in the distal right ureter though localization technically difficult.    After  Toradol and intravenous fluids and Zofran patient was well-appearing and reported that symptoms had almost entirely resolved.  She was comfortable returning home.  Referral for follow-up with urologist and prescription for Flomax, naproxen, and Zofran.  I discussed reasons for return to the emergency department for reassessment especially fever, vomiting, inadequate control of pain,, difficulty urinating.  Discharged in good condition.    ED Course as of 05/07/25 0101   Tue May 06, 2025   1784 I reviewed urgent care note from earlier same day.  Patient was evaluated for mid low back pain radiating to the right side for the past 3 hours associated with nausea and vomiting.  Symptoms improved with urination.  Received oral Zofran and Toradol and referred to emergency department for suspected ureteral stone. []   1249 I reviewed note from 3/9/2024 from [facility].  Patient was evaluated for right-sided abdominal pain for several weeks at that time.  Worse after eating.  Documented medical conditions including acne, anemia, depression, fibroid uterus, ovarian cyst that was removed, spontaneous .  Limited ultrasound of the abdomen at that time demonstrated no gallstones or other abnormalities of the gallbladder.  CT abdomen pelvis with IV contrast did not demonstrate a clear explanation for patient's symptoms. [MC]      ED Course User Index  [MC] Junior Lewis MD         Diagnoses as of 25   Acute right flank pain   Hydronephrosis, unspecified hydronephrosis type            Junior Lewis MD  25

## 2025-05-08 LAB — BACTERIA UR CULT: NORMAL

## 2025-05-19 ENCOUNTER — APPOINTMENT (OUTPATIENT)
Dept: PRIMARY CARE | Facility: CLINIC | Age: 40
End: 2025-05-19
Payer: COMMERCIAL

## 2025-05-19 ENCOUNTER — APPOINTMENT (OUTPATIENT)
Dept: OBSTETRICS AND GYNECOLOGY | Facility: HOSPITAL | Age: 40
End: 2025-05-19
Payer: COMMERCIAL

## 2025-05-19 ENCOUNTER — APPOINTMENT (OUTPATIENT)
Dept: UROLOGY | Facility: HOSPITAL | Age: 40
End: 2025-05-19
Payer: COMMERCIAL

## 2025-05-19 ENCOUNTER — OFFICE VISIT (OUTPATIENT)
Dept: UROLOGY | Facility: CLINIC | Age: 40
End: 2025-05-19
Payer: COMMERCIAL

## 2025-05-19 VITALS — TEMPERATURE: 97.7 F

## 2025-05-19 DIAGNOSIS — R10.9 ACUTE RIGHT FLANK PAIN: ICD-10-CM

## 2025-05-19 DIAGNOSIS — N20.0 KIDNEY STONES: Primary | ICD-10-CM

## 2025-05-19 DIAGNOSIS — N13.30 HYDRONEPHROSIS, UNSPECIFIED HYDRONEPHROSIS TYPE: ICD-10-CM

## 2025-05-19 PROCEDURE — 1036F TOBACCO NON-USER: CPT | Performed by: UROLOGY

## 2025-05-19 PROCEDURE — 99203 OFFICE O/P NEW LOW 30 MIN: CPT | Performed by: UROLOGY

## 2025-05-19 RX ORDER — SODIUM SULFACETAMIDE 100 MG/ML
LIQUID TOPICAL
COMMUNITY
Start: 2025-02-04

## 2025-05-19 RX ORDER — TRETINOIN 1 MG/G
CREAM TOPICAL
COMMUNITY
Start: 2023-11-13

## 2025-05-19 RX ORDER — CLINDAMYCIN PHOSPHATE AND BENZOYL PEROXIDE 10; 50 MG/G; MG/G
GEL TOPICAL
COMMUNITY
Start: 2023-10-19

## 2025-05-19 ASSESSMENT — PAIN SCALES - GENERAL: PAINLEVEL_OUTOF10: 0-NO PAIN

## 2025-05-19 NOTE — PROGRESS NOTES
Subjective   Blank Bear is a 39 y.o. female presenting as a new patient today due to nephrolithiasis. Patient presented to the ER on 5/6/2025 with complaints of  right sided flank pain radiating to the right abdomen associated with nausea and vomiting. CT A&P was obtained which showed right hydronephrosis with delayed renal enhancement and perinephric stranding as well as a punctate 1 mm calcification is noted in the urinary bladder. She has not visualized stone passage. Patient has no prior history of renal stones. Currently she is asymptomatic. Denies any recent gross hematuria, fevers, chills, urinary retention, intractable flank or abdominal pain, nausea or vomiting.          Medical History[1]  Surgical History[2]  Family History[3]  Current Medications[4]  Allergies[5]  Social History     Socioeconomic History    Marital status:      Spouse name: Not on file    Number of children: Not on file    Years of education: Not on file    Highest education level: Not on file   Occupational History    Not on file   Tobacco Use    Smoking status: Never    Smokeless tobacco: Never   Substance and Sexual Activity    Alcohol use: Not Currently    Drug use: Never    Sexual activity: Not on file   Other Topics Concern    Not on file   Social History Narrative    Not on file     Social Drivers of Health     Financial Resource Strain: Low Risk  (7/7/2023)    Received from [facility] ([facility])    Overall Financial Resource Strain (CARDIA)     Difficulty of Paying Living Expenses: Not hard at all   Food Insecurity: No Food Insecurity (7/7/2023)    Received from [facility] ([facility])    Hunger Vital Sign     Worried About Running Out of Food in the Last Year: Never true     Ran Out of Food in the Last Year: Never true   Transportation Needs: No Transportation Needs (7/7/2023)    Received from [facility] ([facility])    PRAPARE - Transportation     Lack  of Transportation (Medical): No     Lack of Transportation (Non-Medical): No   Physical Activity: Sufficiently Active (7/7/2023)    Received from [facility] ([facility])    Exercise Vital Sign     Days of Exercise per Week: 4 days     Minutes of Exercise per Session: 60 min   Stress: No Stress Concern Present (7/7/2023)    Received from [facility] ([facility])    [employer] - Occupational Stress Questionnaire     Feeling of Stress : Only a little   Social Connections: Not on file   Intimate Partner Violence: Not At Risk (7/7/2023)    Received from [facility] ([facility])    Humiliation, Afraid, Rape, and Kick questionnaire     Fear of Current or Ex-Partner: No     Emotionally Abused: No     Physically Abused: No     Sexually Abused: No   Housing Stability: Low Risk  (4/21/2024)    Received from [facility]    Housing Stability     Unstable Housing in the Last Year: Not on file       Review of Systems  Pertinent items are noted in HPI.    Objective       Lab Review  Lab Results   Component Value Date    WBC 13.2 (H) 05/06/2025    RBC 3.63 (L) 05/06/2025    HGB 10.4 (L) 05/06/2025    HCT 32.7 (L) 05/06/2025     05/06/2025      Lab Results   Component Value Date    BUN 13 05/06/2025    CREATININE 1.07 (H) 05/06/2025          Assessment/Plan   There are no diagnoses linked to this encounter.    Nephrolithiasis   Right hydronephrosis    I personally and independently reviewed images of the CT A&P from 5/6/2025 which showed right hydronephrosis with delayed renal enhancement and perinephric stranding as well as a punctate 1 mm calcification is noted in the urinary bladder.    Patient has not visualized stone passage.     We will obtain a renal US to confirm resolution of hydronephrosis and follow up virtually to review.     Patient was educated on stone prevention lifestyle changes and dietary modifications which  include increased water intake, citric acid supplementation in the form of lemon juice or lemonade, low oxalate diet, moderate protein intake, and low sodium intake.         All questions were answered to the patient's satisfaction. Patient agrees with the plan and wishes to proceed. Follow-up will be scheduled appropriately.       Scribed for Dr. Tiwari by Iris Ghotra. I , Dr Tiwari, have personally reviewed and agreed with the information entered by the Virtual Scribe.          [1]   Past Medical History:  Diagnosis Date    Acute vaginitis 05/01/2015    Bacterial vaginosis    Cutaneous abscess of buttock 07/20/2015    Abscess of buttock    Other fecal abnormalities 11/11/2016    Loose stools    Personal history of other specified conditions 12/08/2016    History of diarrhea    Unspecified abdominal pain 12/08/2016    Abdominal cramping    Unspecified ovarian cyst, left side 04/22/2015    Left ovarian cyst   [2]   Past Surgical History:  Procedure Laterality Date    OTHER SURGICAL HISTORY  04/22/2015    Ovarian Cystectomy Right   [3] No family history on file.  [4]   Current Outpatient Medications   Medication Sig Dispense Refill    tamsulosin (Flomax) 0.4 mg 24 hr capsule Take 1 capsule (0.4 mg) by mouth once daily for 14 days. 14 capsule 0     No current facility-administered medications for this visit.   [5]   Allergies  Allergen Reactions    Acetaminophen Hives    Red Dye Hives

## 2025-05-22 ENCOUNTER — HOSPITAL ENCOUNTER (OUTPATIENT)
Dept: RADIOLOGY | Facility: HOSPITAL | Age: 40
Discharge: HOME | End: 2025-05-22
Payer: COMMERCIAL

## 2025-05-22 DIAGNOSIS — N20.0 KIDNEY STONES: ICD-10-CM

## 2025-05-22 PROCEDURE — 76770 US EXAM ABDO BACK WALL COMP: CPT

## 2025-06-02 ENCOUNTER — APPOINTMENT (OUTPATIENT)
Dept: UROLOGY | Facility: CLINIC | Age: 40
End: 2025-06-02
Payer: COMMERCIAL

## 2025-06-02 DIAGNOSIS — N20.0 KIDNEY STONES: ICD-10-CM

## 2025-06-02 DIAGNOSIS — N13.30 HYDRONEPHROSIS, UNSPECIFIED HYDRONEPHROSIS TYPE: Primary | ICD-10-CM

## 2025-06-02 PROCEDURE — 99213 OFFICE O/P EST LOW 20 MIN: CPT | Performed by: UROLOGY

## 2025-06-02 NOTE — PROGRESS NOTES
Subjective     This visit was completed via telemedicine. All issues as below were discussed and addressed but no physical exam was performed unless allowed by visual confirmation. If it was felt that the patient should be evaluated in clinic, then they were directed there. Patient verbally consented to visit.    Blank Bear is a 39 y.o. female with history of nephrolithiasis and hydronephrosis; presenting today to review renal US. Patient is doing well. She is currently asymptomatic Denies any recent gross hematuria, fevers, chills, urinary retention, intractable flank or abdominal pain, nausea or vomiting.          LAST VISIT (5/19/2025):  Blank Bear is a 39 y.o. female presenting as a new patient today due to nephrolithiasis. Patient presented to the ER on 5/6/2025 with complaints of  right sided flank pain radiating to the right abdomen associated with nausea and vomiting. CT A&P was obtained which showed right hydronephrosis with delayed renal enhancement and perinephric stranding as well as a punctate 1 mm calcification is noted in the urinary bladder. She has not visualized stone passage. Patient has no prior history of renal stones. Currently she is asymptomatic. Denies any recent gross hematuria, fevers, chills, urinary retention, intractable flank or abdominal pain, nausea or vomiting.    Medical History[1]  Surgical History[2]  Family History[3]  Current Medications[4]  Allergies[5]  Social History     Socioeconomic History    Marital status:      Spouse name: Not on file    Number of children: Not on file    Years of education: Not on file    Highest education level: Not on file   Occupational History    Not on file   Tobacco Use    Smoking status: Never    Smokeless tobacco: Never   Substance and Sexual Activity    Alcohol use: Not Currently    Drug use: Never    Sexual activity: Not on file   Other Topics Concern    Not on file   Social History Narrative    Not on file     Social Drivers  of Health     Financial Resource Strain: Low Risk  (7/7/2023)    Received from [facility] ([facility])    Overall Financial Resource Strain (CARDIA)     Difficulty of Paying Living Expenses: Not hard at all   Food Insecurity: No Food Insecurity (7/7/2023)    Received from [facility] ([facility])    Hunger Vital Sign     Worried About Running Out of Food in the Last Year: Never true     Ran Out of Food in the Last Year: Never true   Transportation Needs: No Transportation Needs (7/7/2023)    Received from [facility] ([facility])    PRAPARE - Transportation     Lack of Transportation (Medical): No     Lack of Transportation (Non-Medical): No   Physical Activity: Sufficiently Active (7/7/2023)    Received from [facility] ([facility])    Exercise Vital Sign     Days of Exercise per Week: 4 days     Minutes of Exercise per Session: 60 min   Stress: No Stress Concern Present (7/7/2023)    Received from [facility] ([facility])    [employer] [place] [employer] - Occupational Stress Questionnaire     Feeling of Stress : Only a little   Social Connections: Not on file   Intimate Partner Violence: Not At Risk (7/7/2023)    Received from [facility] ([facility])    Humiliation, Afraid, Rape, and Kick questionnaire     Fear of Current or Ex-Partner: No     Emotionally Abused: No     Physically Abused: No     Sexually Abused: No   Housing Stability: Low Risk  (4/21/2024)    Received from [facility]    Housing Stability     Unstable Housing in the Last Year: Not on file       Review of Systems  Pertinent items are noted in HPI.    Objective       Lab Review  Lab Results   Component Value Date    WBC 13.2 (H) 05/06/2025    RBC 3.63 (L) 05/06/2025    HGB 10.4 (L) 05/06/2025    HCT 32.7 (L) 05/06/2025     05/06/2025      Lab Results   Component Value Date    BUN 13 05/06/2025     CREATININE 1.07 (H) 05/06/2025          Assessment/Plan   Diagnoses and all orders for this visit:  Hydronephrosis, unspecified hydronephrosis type  -     CT urography w 3D volume rendered imaging; Future  Kidney stones  -     CT urography w 3D volume rendered imaging; Future      Nephrolithiasis   Right hydronephrosis - persistent       I personally and independently reviewed images of the renal US from 5/22/2025 which showed right-sided hydronephrosis which persists after bladder voiding. Prominence of the left renal pelvis. No sign of shadowing renal calculus.      We will obtain a CTU for further evaluation of hydronephrosis.     Follow up virtually to review.     All questions were answered to the patient's satisfaction. Patient agrees with the plan and wishes to proceed. Follow-up will be scheduled appropriately.       Scribed for Dr. Tiwari by Iris Ghotra. I , Dr Tiwari, have personally reviewed and agreed with the information entered by the Virtual Scribe.          [1]   Past Medical History:  Diagnosis Date    Acute vaginitis 05/01/2015    Bacterial vaginosis    Cutaneous abscess of buttock 07/20/2015    Abscess of buttock    Other fecal abnormalities 11/11/2016    Loose stools    Personal history of other specified conditions 12/08/2016    History of diarrhea    Unspecified abdominal pain 12/08/2016    Abdominal cramping    Unspecified ovarian cyst, left side 04/22/2015    Left ovarian cyst   [2]   Past Surgical History:  Procedure Laterality Date    OTHER SURGICAL HISTORY  04/22/2015    Ovarian Cystectomy Right   [3] No family history on file.  [4]   Current Outpatient Medications   Medication Sig Dispense Refill    clindamycin-benzoyl peroxide (Duac) 1.2-5% gel APPLY THIN LAYER TO ACNE PRONE AREAS IN MORNINGS      sulfacetamide sodium 10 % cleanser USE TO WASH FACE DAILY      tretinoin (Retin-A) 0.1 % cream APPLY PEA SIZED AMOUNT TO ENTIRE FACE NIGHTLY.       No current facility-administered medications  for this visit.   [5]   Allergies  Allergen Reactions    Acetaminophen Hives    Red Dye Hives

## 2025-06-12 ENCOUNTER — APPOINTMENT (OUTPATIENT)
Dept: RADIOLOGY | Facility: HOSPITAL | Age: 40
End: 2025-06-12
Payer: COMMERCIAL

## 2025-06-12 DIAGNOSIS — N20.0 KIDNEY STONES: ICD-10-CM

## 2025-06-12 DIAGNOSIS — N13.30 HYDRONEPHROSIS, UNSPECIFIED HYDRONEPHROSIS TYPE: ICD-10-CM

## 2025-06-12 PROCEDURE — 2550000001 HC RX 255 CONTRASTS: Performed by: UROLOGY

## 2025-06-12 PROCEDURE — 76377 3D RENDER W/INTRP POSTPROCES: CPT

## 2025-06-12 RX ADMIN — IOHEXOL 100 ML: 350 INJECTION, SOLUTION INTRAVENOUS at 18:29

## 2025-06-17 ENCOUNTER — APPOINTMENT (OUTPATIENT)
Dept: UROLOGY | Facility: CLINIC | Age: 40
End: 2025-06-17
Payer: COMMERCIAL

## 2025-06-17 DIAGNOSIS — N13.30 HYDRONEPHROSIS, UNSPECIFIED HYDRONEPHROSIS TYPE: ICD-10-CM

## 2025-06-17 DIAGNOSIS — N20.0 KIDNEY STONES: Primary | ICD-10-CM

## 2025-06-17 PROCEDURE — 99213 OFFICE O/P EST LOW 20 MIN: CPT | Performed by: UROLOGY

## 2025-06-17 NOTE — PROGRESS NOTES
Subjective     This visit was completed via telemedicine. All issues as below were discussed and addressed but no physical exam was performed unless allowed by visual confirmation. If it was felt that the patient should be evaluated in clinic, then they were directed there. Patient verbally consented to visit.    Blank Bear is a 40 y.o. female with history of nephrolithiasis and hydronephrosis. Renal US from 5/22/2025 showed persistent right-sided hydronephrosis. She presents today to review CTU.     Patient reports she recently developed flank pain which has now resolved. She is currently asymptomatic Denies any recent gross hematuria, fevers, chills, urinary retention, nausea or vomiting.          INITIAL VISIT (5/19/2025):  Blank Bear is a 39 y.o. female presenting as a new patient today due to nephrolithiasis. Patient presented to the ER on 5/6/2025 with complaints of  right sided flank pain radiating to the right abdomen associated with nausea and vomiting. CT A&P was obtained which showed right hydronephrosis with delayed renal enhancement and perinephric stranding as well as a punctate 1 mm calcification is noted in the urinary bladder. She has not visualized stone passage. Patient has no prior history of renal stones. Currently she is asymptomatic. Denies any recent gross hematuria, fevers, chills, urinary retention, intractable flank or abdominal pain, nausea or vomiting.    Medical History[1]  Surgical History[2]  Family History[3]  Current Medications[4]  Allergies[5]  Social History     Socioeconomic History    Marital status:      Spouse name: Not on file    Number of children: Not on file    Years of education: Not on file    Highest education level: Not on file   Occupational History    Not on file   Tobacco Use    Smoking status: Never    Smokeless tobacco: Never   Substance and Sexual Activity    Alcohol use: Not Currently    Drug use: Never    Sexual activity: Not on file   Other  Topics Concern    Not on file   Social History Narrative    Not on file     Social Drivers of Health     Financial Resource Strain: Low Risk  (7/7/2023)    Received from [facility] ([facility])    Overall Financial Resource Strain (CARDIA)     Difficulty of Paying Living Expenses: Not hard at all   Food Insecurity: No Food Insecurity (7/7/2023)    Received from [facility] ([facility])    Hunger Vital Sign     Worried About Running Out of Food in the Last Year: Never true     Ran Out of Food in the Last Year: Never true   Transportation Needs: No Transportation Needs (7/7/2023)    Received from [facility] ([facility])    PRAPARE - Transportation     Lack of Transportation (Medical): No     Lack of Transportation (Non-Medical): No   Physical Activity: Sufficiently Active (7/7/2023)    Received from [facility] ([facility])    Exercise Vital Sign     Days of Exercise per Week: 4 days     Minutes of Exercise per Session: 60 min   Stress: No Stress Concern Present (7/7/2023)    Received from [facility] ([facility])    [employer] [place] [employer] - Occupational Stress Questionnaire     Feeling of Stress : Only a little   Social Connections: Not on file   Intimate Partner Violence: Not At Risk (7/7/2023)    Received from [facility] ([facility])    Humiliation, Afraid, Rape, and Kick questionnaire     Fear of Current or Ex-Partner: No     Emotionally Abused: No     Physically Abused: No     Sexually Abused: No   Housing Stability: Low Risk  (4/21/2024)    Received from [facility]    Housing Stability     Unstable Housing in the Last Year: Not on file       Review of Systems  Pertinent items are noted in HPI.    Objective       Lab Review  Lab Results   Component Value Date    WBC 13.2 (H) 05/06/2025    RBC 3.63 (L) 05/06/2025    HGB 10.4 (L) 05/06/2025    HCT 32.7 (L)  05/06/2025     05/06/2025      Lab Results   Component Value Date    BUN 13 05/06/2025    CREATININE 1.07 (H) 05/06/2025          Assessment/Plan   There are no diagnoses linked to this encounter.      Nephrolithiasis   Right hydronephrosis - improved    I personally and independently reviewed images of the CTU from 6/12/2025 which showed persistent mild right hydronephrosis with improved appearance. Resolution of delayed right nephrogram and perinephric fat stranding. No nephrolithiasis bilaterally.       Patient recently developed flank pain which has now resolved. Currently she is asymptomatic.    I advised her to contact the office if symptoms reoccur.     Otherwise, we will follow up annually with renal US to monitor stone presence and formation.     All questions were answered to the patient's satisfaction. Patient agrees with the plan and wishes to proceed. Follow-up will be scheduled appropriately.       Scribed for Dr. Tiwari by Iris Ghotra. I , Dr Tiwari, have personally reviewed and agreed with the information entered by the Virtual Scribe.          [1]   Past Medical History:  Diagnosis Date    Acute vaginitis 05/01/2015    Bacterial vaginosis    Cutaneous abscess of buttock 07/20/2015    Abscess of buttock    Other fecal abnormalities 11/11/2016    Loose stools    Personal history of other specified conditions 12/08/2016    History of diarrhea    Unspecified abdominal pain 12/08/2016    Abdominal cramping    Unspecified ovarian cyst, left side 04/22/2015    Left ovarian cyst   [2]   Past Surgical History:  Procedure Laterality Date    OTHER SURGICAL HISTORY  04/22/2015    Ovarian Cystectomy Right   [3] No family history on file.  [4]   Current Outpatient Medications   Medication Sig Dispense Refill    clindamycin-benzoyl peroxide (Duac) 1.2-5% gel APPLY THIN LAYER TO ACNE PRONE AREAS IN MORNINGS      sulfacetamide sodium 10 % cleanser USE TO WASH FACE DAILY      tretinoin (Retin-A) 0.1 % cream  APPLY PEA SIZED AMOUNT TO ENTIRE FACE NIGHTLY.       No current facility-administered medications for this visit.   [5]   Allergies  Allergen Reactions    Acetaminophen Hives    Red Dye Hives

## 2025-06-24 ENCOUNTER — APPOINTMENT (OUTPATIENT)
Dept: UROLOGY | Facility: CLINIC | Age: 40
End: 2025-06-24
Payer: COMMERCIAL

## 2025-07-07 ENCOUNTER — APPOINTMENT (OUTPATIENT)
Dept: OBSTETRICS AND GYNECOLOGY | Facility: HOSPITAL | Age: 40
End: 2025-07-07
Payer: COMMERCIAL

## 2025-07-28 ENCOUNTER — APPOINTMENT (OUTPATIENT)
Dept: OBSTETRICS AND GYNECOLOGY | Facility: HOSPITAL | Age: 40
End: 2025-07-28
Payer: COMMERCIAL

## 2025-07-28 VITALS
HEIGHT: 66 IN | BODY MASS INDEX: 30.53 KG/M2 | DIASTOLIC BLOOD PRESSURE: 88 MMHG | WEIGHT: 190 LBS | SYSTOLIC BLOOD PRESSURE: 139 MMHG

## 2025-07-28 DIAGNOSIS — E66.09 CLASS 1 OBESITY DUE TO EXCESS CALORIES WITHOUT SERIOUS COMORBIDITY WITH BODY MASS INDEX (BMI) OF 30.0 TO 30.9 IN ADULT: ICD-10-CM

## 2025-07-28 DIAGNOSIS — N95.1 PERIMENOPAUSAL: ICD-10-CM

## 2025-07-28 DIAGNOSIS — Z01.419 WELL WOMAN EXAM: Primary | ICD-10-CM

## 2025-07-28 DIAGNOSIS — Z12.4 CERVICAL CANCER SCREENING: ICD-10-CM

## 2025-07-28 DIAGNOSIS — E66.811 CLASS 1 OBESITY DUE TO EXCESS CALORIES WITHOUT SERIOUS COMORBIDITY WITH BODY MASS INDEX (BMI) OF 30.0 TO 30.9 IN ADULT: ICD-10-CM

## 2025-07-28 PROCEDURE — 99386 PREV VISIT NEW AGE 40-64: CPT | Performed by: STUDENT IN AN ORGANIZED HEALTH CARE EDUCATION/TRAINING PROGRAM

## 2025-07-28 PROCEDURE — 3008F BODY MASS INDEX DOCD: CPT | Performed by: STUDENT IN AN ORGANIZED HEALTH CARE EDUCATION/TRAINING PROGRAM

## 2025-07-28 PROCEDURE — 87491 CHLMYD TRACH DNA AMP PROBE: CPT | Performed by: STUDENT IN AN ORGANIZED HEALTH CARE EDUCATION/TRAINING PROGRAM

## 2025-07-28 PROCEDURE — 87661 TRICHOMONAS VAGINALIS AMPLIF: CPT | Performed by: STUDENT IN AN ORGANIZED HEALTH CARE EDUCATION/TRAINING PROGRAM

## 2025-07-28 PROCEDURE — 1036F TOBACCO NON-USER: CPT | Performed by: STUDENT IN AN ORGANIZED HEALTH CARE EDUCATION/TRAINING PROGRAM

## 2025-07-28 SDOH — ECONOMIC STABILITY: FOOD INSECURITY: WITHIN THE PAST 12 MONTHS, THE FOOD YOU BOUGHT JUST DIDN'T LAST AND YOU DIDN'T HAVE MONEY TO GET MORE.: NEVER TRUE

## 2025-07-28 SDOH — ECONOMIC STABILITY: FOOD INSECURITY: WITHIN THE PAST 12 MONTHS, YOU WORRIED THAT YOUR FOOD WOULD RUN OUT BEFORE YOU GOT MONEY TO BUY MORE.: NEVER TRUE

## 2025-07-28 ASSESSMENT — ENCOUNTER SYMPTOMS
MUSCULOSKELETAL NEGATIVE: 0
CARDIOVASCULAR NEGATIVE: 0
PSYCHIATRIC NEGATIVE: 0
NEUROLOGICAL NEGATIVE: 0
ALLERGIC/IMMUNOLOGIC NEGATIVE: 0
GASTROINTESTINAL NEGATIVE: 0
RESPIRATORY NEGATIVE: 0
HEMATOLOGIC/LYMPHATIC NEGATIVE: 0
EYES NEGATIVE: 0
CONSTITUTIONAL NEGATIVE: 0
ENDOCRINE NEGATIVE: 0

## 2025-07-28 ASSESSMENT — LIFESTYLE VARIABLES
AUDIT-C TOTAL SCORE: 0
HOW OFTEN DO YOU HAVE A DRINK CONTAINING ALCOHOL: NEVER
HOW OFTEN DO YOU HAVE SIX OR MORE DRINKS ON ONE OCCASION: NEVER
HOW MANY STANDARD DRINKS CONTAINING ALCOHOL DO YOU HAVE ON A TYPICAL DAY: PATIENT DOES NOT DRINK
SKIP TO QUESTIONS 9-10: 1

## 2025-07-28 ASSESSMENT — PATIENT HEALTH QUESTIONNAIRE - PHQ9
1. LITTLE INTEREST OR PLEASURE IN DOING THINGS: NOT AT ALL
2. FEELING DOWN, DEPRESSED OR HOPELESS: NOT AT ALL
SUM OF ALL RESPONSES TO PHQ9 QUESTIONS 1 & 2: 0

## 2025-07-28 ASSESSMENT — PAIN SCALES - GENERAL: PAINLEVEL_OUTOF10: 0-NO PAIN

## 2025-07-28 NOTE — PROGRESS NOTES
"Blank Bear is a 40 y.o. female who is here for a routine exam. PCP = Bety Morales MD    Has been having nightly hot flashes. Able to sleep through the night and doesn't feel as though it disrupts her day, but noticeable. Sex has been somewhat more uncomfortable but still active approx 3 times a month. Has struggled with being able to achieve weight loss. Denies urinary, mood, or other symptoms related to menopause.     Periods are regular every 28-30 days    Social History     Substance and Sexual Activity   Sexual Activity Yes    Partners: Male   Sexual Hx: male  Current contraception: None  FamHx Breast/Ov/Colon cancer: denies    Objective   /88   Ht 1.676 m (5' 6\")   Wt 86.2 kg (190 lb)   LMP 07/21/2025 (Exact Date)   BMI 30.67 kg/m²   Physical Exam  Constitutional:       Appearance: Normal appearance.   Genitourinary:      No lesions in the vagina.      Right Labia: No rash, tenderness or lesions.     Left Labia: No tenderness, lesions or rash.     No vaginal discharge, tenderness or bleeding.        Right Adnexa: not tender, not full and no mass present.     Left Adnexa: not tender, not full and no mass present.     Cervix is nulliparous.      No cervical motion tenderness, discharge, friability, lesion, polyp or nabothian cyst.      Uterus is not enlarged, tender or irregular.   Breasts:     Right: Normal.      Left: Normal.   HENT:      Head: Normocephalic and atraumatic.      Mouth/Throat:      Mouth: Mucous membranes are moist.     Eyes:      Extraocular Movements: Extraocular movements intact.      Conjunctiva/sclera: Conjunctivae normal.      Pupils: Pupils are equal, round, and reactive to light.     Pulmonary:      Effort: Pulmonary effort is normal.   Abdominal:      General: Abdomen is flat.      Palpations: Abdomen is soft.     Musculoskeletal:         General: Normal range of motion.      Cervical back: Normal range of motion.     Neurological:      General: No focal " deficit present.      Mental Status: She is alert.     Skin:     General: Skin is warm and dry.     Psychiatric:         Mood and Affect: Mood normal.         Behavior: Behavior normal.         Thought Content: Thought content normal.         Judgment: Judgment normal.         Annual  -Pap and HPV 2017 NILM HPV neg  -Mammogram: 2024 but needed US (was done in [place]), due     Orders Placed This Encounter   Procedures    BI mammo bilateral screening tomosynthesis     Reason for exam::   routine     Radiologist to Determine Optimal Study:   Yes     Release result to [employer]:   Immediate [1]     Is this exam part of a Research Study? If Yes, link this order to the research study:   No    Hepatitis BsAg     Standing Status:   Future     Number of Occurrences:   1     Expected Date:   7/28/2025     Expiration Date:   7/28/2026     Release result to [employer]:   Immediate [1]    Hepatitis C Antibody     Standing Status:   Future     Number of Occurrences:   1     Expected Date:   7/28/2025     Expiration Date:   7/28/2026     Release result to [employer]:   Immediate [1]    HIV 1/2 Antigen/Antibody Screen with Reflex to Confirmation     Standing Status:   Future     Number of Occurrences:   1     Expected Date:   7/28/2025     Expiration Date:   7/28/2026     Release result to [employer]:   Immediate [1]    Syphilis Screen with Reflex     Standing Status:   Future     Number of Occurrences:   1     Expected Date:   7/28/2025     Expiration Date:   7/28/2026     Release result to [employer]:   Immediate [1]    Referral to Endocrinology     Standing Status:   Future     Expected Date:   7/28/2025     Expiration Date:   7/28/2026     Referral Priority:   Routine     Referral Type:   Consultation     Referral Reason:   Specialty Services Required     Requested Specialty:   Endocrinology     Number of Visits Requested:   1       Problem List Items Addressed This Visit    None  Visit Diagnoses         Well woman exam    -  Primary     Relevant Orders    BI mammo bilateral screening tomosynthesis    THINPREP PAP TEST (>30)    Hepatitis BsAg    Hepatitis C Antibody    HIV 1/2 Antigen/Antibody Screen with Reflex to Confirmation    Syphilis Screen with Reflex      Cervical cancer screening        Relevant Orders    THINPREP PAP TEST (>30)      Class 1 obesity due to excess calories without serious comorbidity with body mass index (BMI) of 30.0 to 30.9 in adult        Relevant Orders    Referral to Endocrinology      Perimenopausal        Relevant Orders    Referral to Endocrinology             Thank you for coming to your annual exam.       Farzaneh Almaguer MD

## 2025-07-29 LAB
C TRACH RRNA SPEC QL NAA+PROBE: NEGATIVE
N GONORRHOEA DNA SPEC QL PROBE+SIG AMP: NEGATIVE
T VAGINALIS RRNA SPEC QL NAA+PROBE: NEGATIVE

## 2025-08-11 LAB
CYTOLOGY CMNT CVX/VAG CYTO-IMP: NORMAL
HPV HR 12 DNA GENITAL QL NAA+PROBE: NEGATIVE
HPV HR GENOTYPES PNL CVX NAA+PROBE: NEGATIVE
HPV16 DNA SPEC QL NAA+PROBE: NEGATIVE
HPV18 DNA SPEC QL NAA+PROBE: NEGATIVE
LAB AP HPV GENOTYPE QUESTION: YES
LAB AP HPV HR: NORMAL
LAB AP PAP ADDITIONAL TESTS: NORMAL
LABORATORY COMMENT REPORT: NORMAL
PATH REPORT.TOTAL CANCER: NORMAL

## 2026-02-23 ENCOUNTER — APPOINTMENT (OUTPATIENT)
Dept: ENDOCRINOLOGY | Facility: CLINIC | Age: 41
End: 2026-02-23
Payer: COMMERCIAL

## 2026-06-23 ENCOUNTER — APPOINTMENT (OUTPATIENT)
Dept: UROLOGY | Facility: CLINIC | Age: 41
End: 2026-06-23
Payer: COMMERCIAL